# Patient Record
Sex: FEMALE | Race: WHITE | NOT HISPANIC OR LATINO | Employment: FULL TIME | URBAN - METROPOLITAN AREA
[De-identification: names, ages, dates, MRNs, and addresses within clinical notes are randomized per-mention and may not be internally consistent; named-entity substitution may affect disease eponyms.]

---

## 2023-03-30 RX ORDER — DROSPIRENONE AND ETHINYL ESTRADIOL 0.02-3(28)
KIT ORAL
COMMUNITY
End: 2024-06-10 | Stop reason: ALTCHOICE

## 2023-03-30 RX ORDER — LISINOPRIL 2.5 MG/1
2.5 TABLET ORAL DAILY
COMMUNITY
Start: 2022-12-04 | End: 2024-06-10 | Stop reason: ALTCHOICE

## 2023-03-30 RX ORDER — SERTRALINE HYDROCHLORIDE 100 MG/1
100 TABLET, FILM COATED ORAL DAILY
COMMUNITY
Start: 2022-04-20 | End: 2024-06-10 | Stop reason: ALTCHOICE

## 2023-03-30 RX ORDER — CETIRIZINE HYDROCHLORIDE 10 MG/1
10 TABLET ORAL DAILY
COMMUNITY
Start: 2022-02-25

## 2023-03-30 RX ORDER — ATORVASTATIN CALCIUM 80 MG/1
40 TABLET, FILM COATED ORAL NIGHTLY
COMMUNITY
Start: 2022-12-04 | End: 2024-05-30 | Stop reason: WASHOUT

## 2023-03-30 RX ORDER — METOPROLOL TARTRATE 25 MG/1
12.5 TABLET, FILM COATED ORAL DAILY
COMMUNITY
Start: 2022-12-12 | End: 2024-03-05

## 2023-03-30 RX ORDER — CYANOCOBALAMIN (VITAMIN B-12) 500 MCG
400 TABLET ORAL
COMMUNITY

## 2023-03-31 ENCOUNTER — APPOINTMENT (OUTPATIENT)
Dept: PRIMARY CARE | Facility: CLINIC | Age: 43
End: 2023-03-31
Payer: COMMERCIAL

## 2023-03-31 ENCOUNTER — OFFICE VISIT (OUTPATIENT)
Dept: PRIMARY CARE | Facility: CLINIC | Age: 43
End: 2023-03-31
Payer: COMMERCIAL

## 2023-03-31 VITALS
DIASTOLIC BLOOD PRESSURE: 70 MMHG | HEART RATE: 84 BPM | BODY MASS INDEX: 36.54 KG/M2 | SYSTOLIC BLOOD PRESSURE: 112 MMHG | WEIGHT: 223 LBS | TEMPERATURE: 96.7 F | OXYGEN SATURATION: 98 %

## 2023-03-31 DIAGNOSIS — L03.112 CELLULITIS OF LEFT AXILLA: Primary | ICD-10-CM

## 2023-03-31 PROCEDURE — 99214 OFFICE O/P EST MOD 30 MIN: CPT | Performed by: FAMILY MEDICINE

## 2023-03-31 PROCEDURE — 1036F TOBACCO NON-USER: CPT | Performed by: FAMILY MEDICINE

## 2023-03-31 RX ORDER — CLINDAMYCIN HYDROCHLORIDE 300 MG/1
300 CAPSULE ORAL 2 TIMES DAILY
Qty: 14 CAPSULE | Refills: 0 | Status: SHIPPED | OUTPATIENT
Start: 2023-03-31 | End: 2023-04-07

## 2023-03-31 NOTE — PROGRESS NOTES
Subjective   Patient ID: Ilsa Alex is a 42 y.o. female who presents for pt c/o bumps under left armpit.    Ilsa presents to discuss lesions under left armpit.  This problem initially started about 3 weeks ago.  She went to an urgent care where the lesion was drained.  She was started on doxycycline.  Due to worsening of her symptoms he went back to urgent care and was given a prescription for cephalexin.  Main lesion healed.  However now she has multiple lesions in the left armpit.  They are red and tender to the touch.  There is no significant swelling.  She has not had any fevers or chills.  Does have a history of MRSA.           Review of Systems   Constitutional:  Negative for appetite change, chills, fatigue and fever.   Respiratory:  Negative for cough and shortness of breath.    Cardiovascular:  Negative for chest pain.   Gastrointestinal:  Negative for abdominal pain, constipation, diarrhea, nausea and vomiting.   Skin:  Negative for rash.       Objective   /70   Pulse 84   Temp 35.9 °C (96.7 °F)   Wt 101 kg (223 lb)   SpO2 98%   BMI 36.54 kg/m²     Physical Exam  Constitutional:       General: She is not in acute distress.     Appearance: Normal appearance.   HENT:      Head: Normocephalic.      Mouth/Throat:      Mouth: Mucous membranes are moist.   Eyes:      Extraocular Movements: Extraocular movements intact.      Conjunctiva/sclera: Conjunctivae normal.   Cardiovascular:      Rate and Rhythm: Normal rate and regular rhythm.      Heart sounds: No murmur heard.  Pulmonary:      Breath sounds: No wheezing or rhonchi.   Musculoskeletal:      Cervical back: Neck supple.   Skin:     General: Skin is warm and dry.      Comments: Scattered erythematous comedones in left axilla.  Tender to touch and warm.   Neurological:      Mental Status: She is alert.   Psychiatric:         Mood and Affect: Mood normal.         Behavior: Behavior normal.         Assessment/Plan   Diagnoses and all  orders for this visit:  Cellulitis of left axilla  -     clindamycin (Cleocin) 300 mg capsule; Take 1 capsule (300 mg) by mouth in the morning and 1 capsule (300 mg) before bedtime. Do all this for 7 days.  Due to recurrence of axillary lesions, recommend that patient's start using shaving cream when shaving armpits.

## 2023-04-02 ASSESSMENT — ENCOUNTER SYMPTOMS
FATIGUE: 0
COUGH: 0
FEVER: 0
CHILLS: 0
CONSTIPATION: 0
NAUSEA: 0
SHORTNESS OF BREATH: 0
ABDOMINAL PAIN: 0
DIARRHEA: 0
VOMITING: 0
APPETITE CHANGE: 0

## 2023-08-24 ENCOUNTER — OFFICE VISIT (OUTPATIENT)
Dept: PRIMARY CARE | Facility: CLINIC | Age: 43
End: 2023-08-24
Payer: COMMERCIAL

## 2023-08-24 VITALS
SYSTOLIC BLOOD PRESSURE: 122 MMHG | OXYGEN SATURATION: 98 % | BODY MASS INDEX: 36.54 KG/M2 | DIASTOLIC BLOOD PRESSURE: 76 MMHG | TEMPERATURE: 97.6 F | WEIGHT: 223 LBS | HEART RATE: 75 BPM

## 2023-08-24 DIAGNOSIS — B07.0 PLANTAR WARTS: ICD-10-CM

## 2023-08-24 DIAGNOSIS — B35.3 TINEA PEDIS OF LEFT FOOT: Primary | ICD-10-CM

## 2023-08-24 PROCEDURE — 99213 OFFICE O/P EST LOW 20 MIN: CPT | Performed by: PHYSICIAN ASSISTANT

## 2023-08-24 PROCEDURE — 1036F TOBACCO NON-USER: CPT | Performed by: PHYSICIAN ASSISTANT

## 2023-08-24 RX ORDER — CLOTRIMAZOLE AND BETAMETHASONE DIPROPIONATE 10; .64 MG/G; MG/G
1 CREAM TOPICAL 2 TIMES DAILY
Qty: 30 G | Refills: 1 | Status: SHIPPED | OUTPATIENT
Start: 2023-08-24

## 2023-08-24 RX ORDER — DIROXIMEL FUMARATE 231 MG/1
CAPSULE ORAL
COMMUNITY
Start: 2023-07-19

## 2023-08-24 NOTE — PROGRESS NOTES
Subjective   Patient ID: Ilsa Alex is a 43 y.o. female who presents for pt c/o painful bumps on bottom of foot (Just noticed today).    HPI   Patient complains of a couple small bumps on bottom of left foot that she just happened to notice the past day. They are slightly tender with pressure.  No bumps noted elsewhere.     Has been having some red, scaly rash interdigitally and on 5th toe the past several weeks. Itches/burns. Applying cortisone helps slightly.     Review of Systems    Objective   /76   Pulse 75   Temp 36.4 °C (97.6 °F)   Wt 101 kg (223 lb)   SpO2 98%   BMI 36.54 kg/m²     Physical Exam  Vitals and nursing note reviewed.   Constitutional:       Appearance: Normal appearance. She is well-developed.   Eyes:      General: No scleral icterus.  Cardiovascular:      Rate and Rhythm: Normal rate and regular rhythm.      Heart sounds: No murmur heard.  Pulmonary:      Effort: Pulmonary effort is normal.      Breath sounds: Normal breath sounds.   Musculoskeletal:      Cervical back: Neck supple.   Skin:     General: Skin is warm and dry.      Comments: Left foot with flaky red rash interdigitally that is slightly moist, with some red flaking on dorsal aspect of the 5th toe, consistent with tinea.  On the plantar surface of left foot there are 3 small bumps (2-3 mm in diameter) that appear to have a dark central spot under the surface centrally. Appears consistent with small verruca.    Neurological:      Mental Status: She is alert.         Assessment/Plan   Diagnoses and all orders for this visit:  Tinea pedis of left foot  -     clotrimazole-betamethasone (Lotrisone) cream; Apply 1 Application topically 2 times a day. Apply to toes on feet.  Plantar warts       Apply Compound W to probable warts every day.   Rx clotrimazole/betamethasone to interdigital rash.   Follow up if symptoms increase or persist. Can call for referral to derm or podiatrist if symptoms persist.

## 2023-08-25 LAB — THYROTROPIN (MIU/L) IN SER/PLAS BY DETECTION LIMIT <= 0.05 MIU/L: 3.01 MIU/L (ref 0.44–3.98)

## 2023-08-26 LAB
ESTRADIOL (PG/ML) IN SER/PLAS: 80 PG/ML
FOLLITROPIN (IU/L) IN SER/PLAS: 4.3 IU/L
LUTEINIZING HORMONE (IU/ML) IN SER/PLAS: 5.8 IU/L
PROGESTERONE (NG/ML) IN SER/PLAS: 11.2 NG/ML

## 2024-01-17 ENCOUNTER — OFFICE VISIT (OUTPATIENT)
Dept: PRIMARY CARE | Facility: CLINIC | Age: 44
End: 2024-01-17
Payer: COMMERCIAL

## 2024-01-17 VITALS
OXYGEN SATURATION: 96 % | TEMPERATURE: 96.9 F | HEART RATE: 62 BPM | DIASTOLIC BLOOD PRESSURE: 82 MMHG | SYSTOLIC BLOOD PRESSURE: 124 MMHG

## 2024-01-17 DIAGNOSIS — J01.00 ACUTE NON-RECURRENT MAXILLARY SINUSITIS: Primary | ICD-10-CM

## 2024-01-17 PROBLEM — F32.A DEPRESSIVE DISORDER: Status: ACTIVE | Noted: 2023-11-10

## 2024-01-17 LAB
FLUAV RNA RESP QL NAA+PROBE: NOT DETECTED
FLUBV RNA RESP QL NAA+PROBE: NOT DETECTED
RSV RNA RESP QL NAA+PROBE: NOT DETECTED

## 2024-01-17 PROCEDURE — 87636 SARSCOV2 & INF A&B AMP PRB: CPT

## 2024-01-17 PROCEDURE — 87634 RSV DNA/RNA AMP PROBE: CPT

## 2024-01-17 PROCEDURE — 1036F TOBACCO NON-USER: CPT | Performed by: FAMILY MEDICINE

## 2024-01-17 PROCEDURE — 99213 OFFICE O/P EST LOW 20 MIN: CPT | Performed by: FAMILY MEDICINE

## 2024-01-17 RX ORDER — FLUOCINONIDE 0.5 MG/G
OINTMENT TOPICAL
COMMUNITY
Start: 2023-06-05

## 2024-01-17 RX ORDER — BUSPIRONE HYDROCHLORIDE 7.5 MG/1
7.5 TABLET ORAL 2 TIMES DAILY
COMMUNITY
Start: 2024-01-04

## 2024-01-17 RX ORDER — CARVEDILOL 3.12 MG/1
TABLET ORAL
COMMUNITY
Start: 2024-01-15

## 2024-01-17 RX ORDER — AMOXICILLIN AND CLAVULANATE POTASSIUM 875; 125 MG/1; MG/1
875 TABLET, FILM COATED ORAL 2 TIMES DAILY
Qty: 20 TABLET | Refills: 0 | Status: SHIPPED | OUTPATIENT
Start: 2024-01-20 | End: 2024-01-30

## 2024-01-17 RX ORDER — FLUOXETINE HYDROCHLORIDE 20 MG/1
CAPSULE ORAL
COMMUNITY
Start: 2023-11-10 | End: 2024-05-30 | Stop reason: WASHOUT

## 2024-01-17 RX ORDER — CLINDAMYCIN PHOSPHATE 10 UG/ML
LOTION TOPICAL
COMMUNITY
Start: 2023-06-05

## 2024-01-17 NOTE — PROGRESS NOTES
Subjective   Patient ID: Ilsa Alex is a 43 y.o. female who presents for Sinus Problem (Pressure, drainage ) and Headache (X 5 days).  HPI patient presents with sinus pressure congestion and drainage and headache x 5 days.  Has not tested for COVID at home.  Has numerous family members who are sick.  No fevers or chills that she is able to report.  No shortness of breath.    Patient Active Problem List   Diagnosis    Depressive disorder    IBS (irritable bowel syndrome)    Multiple sclerosis (CMS/HCC)       Social Connections: Not on file       Current Outpatient Medications on File Prior to Visit   Medication Sig Dispense Refill    atorvastatin (Lipitor) 80 mg tablet Take 0.5 tablets (40 mg) by mouth once daily at bedtime.      busPIRone (Buspar) 7.5 mg tablet Take 1 tablet (7.5 mg) by mouth 2 times a day.      carvedilol (Coreg) 3.125 mg tablet       cetirizine (ZyrTEC) 10 mg tablet Take 1 tablet (10 mg) by mouth once daily.      cholecalciferol (Vitamin D-3) 10 MCG (400 UNIT) tablet Take 1 tablet (10 mcg) by mouth.      clindamycin (Cleocin T) 1 % lotion Apply once daily as directed to the affected area after shaving      clotrimazole-betamethasone (Lotrisone) cream Apply 1 Application topically 2 times a day. Apply to toes on feet. 30 g 1    drospirenone-ethinyl estradioL (Lainey, Gianvi) 3-0.02 mg tablet       fluocinonide (Lidex) 0.05 % ointment Apply to affected area twice daily as needed. Cycle 2 weeks on, 1 week off. Not for face, armpits, or groin      FLUoxetine (PROzac) 20 mg capsule TAKE 1 CAPSULE DAILY FOR 7 DAYS, THEN 2 CAPSULES DAILY.      lisinopril 2.5 mg tablet Take 1 tablet (2.5 mg) by mouth once daily.      metoprolol tartrate (Lopressor) 25 mg tablet Take 0.5 tablets (12.5 mg) by mouth early in the morning..      multivit,calc,mins/iron/folic (ONE-A-DAY WOMENS FORMULA ORAL) Take 1 tablet by mouth once daily.      NUTRITIONAL SUPPLEMENTS ORAL Protandum Supplement      sertraline (Zoloft) 100  mg tablet Take 1 tablet (100 mg) by mouth once daily.      ticagrelor (Brilinta) 90 mg tablet Take 1 tablet (90 mg) by mouth 2 times a day.      Vumerity 231 mg capsule,delayed release(DR/EC) Take 1 capsule (231 mg) by mouth twice daily for 7 days, THEN 2 capsules (462 mg) twice daily for 23 days.       No current facility-administered medications on file prior to visit.        Vitals:    01/17/24 1141   BP: 124/82   Pulse: 62   Temp: 36.1 °C (96.9 °F)   SpO2: 96%     There were no vitals filed for this visit.    Review of Systems   All other systems reviewed and are negative.      Objective     Physical Exam  Vitals reviewed.   Constitutional:       General: She is not in acute distress.     Appearance: Normal appearance. She is well-developed. She is not diaphoretic.   HENT:      Head: Normocephalic and atraumatic.      Right Ear: Tympanic membrane normal.      Left Ear: Tympanic membrane normal.      Nose: Nose normal.      Mouth/Throat:      Mouth: Mucous membranes are moist.   Eyes:      Pupils: Pupils are equal, round, and reactive to light.   Cardiovascular:      Rate and Rhythm: Normal rate and regular rhythm.      Heart sounds: Normal heart sounds. No murmur heard.     No friction rub. No gallop.   Pulmonary:      Effort: Pulmonary effort is normal.      Breath sounds: Normal breath sounds. No rales.   Abdominal:      General: Bowel sounds are normal.      Palpations: Abdomen is soft.      Tenderness: There is no abdominal tenderness.   Musculoskeletal:      Cervical back: Normal range of motion and neck supple.   Skin:     General: Skin is warm and dry.   Neurological:      Mental Status: She is alert.   Psychiatric:         Mood and Affect: Mood normal.         No visits with results within 2 Month(s) from this visit.   Latest known visit with results is:   Orders Only on 08/25/2023   Component Date Value Ref Range Status    Progesterone 08/25/2023 11.2  ng/mL Final    Comment: REF VALUES  MALE               <0.3- 1.2    FOLLICULAR PHASE  <0.3- 1.4       LUTEAL PHASE       3.3-25.6     MID-LUTEAL PHASE   4.4-28.0  POSTMENOPAUSAL    <0.3- 0.7  PREGNANT FEMALES:     1ST TRIMESTER     11.2- 90.0         2ND TRIMESTER     25.6- 89.4     3RD TRIMESTER     48.4-422.5   .  Patients receiving DHEA-S supplements may show false elevation of  progesterone for results near 1.0 ng/mL. Contact laboratory at  [phone] if alternative testing is needed.      Follicle Stimulating Hormone 08/25/2023 4.3  IU/L Final    Comment: REF VALUES  FOLLICULAR  2-12  MID-CYCLE     12-25  LUTEAL PHASE   2-12  MENOPAUSE       PREPUBERTY    50% ADULT  ADULT MALE     2-10  INFANTS        0-1      Luteinizing Hormone 08/25/2023 5.8  IU/L Final    Comment: REF VALUES  FOLLICULAR PHASE 1.9-12.5  MID-CYCLE        8.7-76.3  LUTEAL PHASE     0.5-16.9  POST MENOPAUSE   5.0-55.2  CHILDREN           0- 6.0  ADULT MALE 18-70 1.5- 9.3  ADULT MALE >70   3.1-34.6      Estradiol 08/25/2023 80  pg/mL Final    Comment: REF VALUES  FOLLICULAR PHASE      MID CYCLE             LUTEAL PHASE          POSTMENOPAUSE         < 32  PREPUBERTY            < 20  FEMALE 10-18Y        8-110  MALE   10-18Y         < 20  ADULT MALE            < 40      TSH 08/25/2023 3.01  0.44 - 3.98 mIU/L Final    Comment:  TSH testing is performed using different testing    methodology at [facility] than at other    [facility] hospitals. Direct result comparisons should    only be made within the same method.         Assessment/Plan   Problem List Items Addressed This Visit    None  Visit Diagnoses         Codes    Acute non-recurrent maxillary sinusitis    -  Primary J01.00    Relevant Medications    amoxicillin-pot clavulanate (Augmentin) 875-125 mg tablet (Start on 1/20/2024)    Other Relevant Orders    Sars-CoV-2 PCR, Symptomatic    Influenza A, and B PCR    RSV PCR          Testing patient for RSV flu and COVID.  Too early for antibiotics to be useful  but postdated a prescription that if she is not feeling better in 4 days then she can have it filled.

## 2024-01-18 ENCOUNTER — TELEPHONE (OUTPATIENT)
Dept: PRIMARY CARE | Facility: CLINIC | Age: 44
End: 2024-01-18
Payer: COMMERCIAL

## 2024-01-18 LAB — SARS-COV-2 RNA RESP QL NAA+PROBE: NOT DETECTED

## 2024-01-18 NOTE — TELEPHONE ENCOUNTER
----- Message from Lang Pulido MD sent at 1/18/2024 10:06 AM EST -----  Patient's viral swabs are negative

## 2024-02-29 DIAGNOSIS — I42.9 CARDIOMYOPATHY, UNSPECIFIED (MULTI): ICD-10-CM

## 2024-03-01 RX ORDER — TICAGRELOR 90 MG/1
90 TABLET ORAL 2 TIMES DAILY
Qty: 180 TABLET | Refills: 4 | Status: SHIPPED | OUTPATIENT
Start: 2024-03-01

## 2024-03-05 DIAGNOSIS — I10 PRIMARY HYPERTENSION: Primary | ICD-10-CM

## 2024-03-05 RX ORDER — METOPROLOL TARTRATE 25 MG/1
TABLET, FILM COATED ORAL
Qty: 90 TABLET | Refills: 1 | Status: SHIPPED | OUTPATIENT
Start: 2024-03-05

## 2024-03-15 ENCOUNTER — OFFICE VISIT (OUTPATIENT)
Dept: PRIMARY CARE | Facility: CLINIC | Age: 44
End: 2024-03-15
Payer: COMMERCIAL

## 2024-03-15 VITALS
OXYGEN SATURATION: 96 % | DIASTOLIC BLOOD PRESSURE: 70 MMHG | TEMPERATURE: 97.2 F | HEART RATE: 71 BPM | SYSTOLIC BLOOD PRESSURE: 120 MMHG

## 2024-03-15 DIAGNOSIS — J32.9 SINUSITIS, UNSPECIFIED CHRONICITY, UNSPECIFIED LOCATION: Primary | ICD-10-CM

## 2024-03-15 DIAGNOSIS — R05.1 ACUTE COUGH: ICD-10-CM

## 2024-03-15 PROCEDURE — 1036F TOBACCO NON-USER: CPT | Performed by: PHYSICIAN ASSISTANT

## 2024-03-15 PROCEDURE — 87636 SARSCOV2 & INF A&B AMP PRB: CPT

## 2024-03-15 PROCEDURE — 99214 OFFICE O/P EST MOD 30 MIN: CPT | Performed by: PHYSICIAN ASSISTANT

## 2024-03-15 RX ORDER — BENZONATATE 100 MG/1
100 CAPSULE ORAL 3 TIMES DAILY PRN
Qty: 42 CAPSULE | Refills: 0 | Status: SHIPPED | OUTPATIENT
Start: 2024-03-15 | End: 2024-04-14

## 2024-03-15 RX ORDER — AMOXICILLIN AND CLAVULANATE POTASSIUM 875; 125 MG/1; MG/1
875 TABLET, FILM COATED ORAL 2 TIMES DAILY
Qty: 20 TABLET | Refills: 0 | Status: SHIPPED | OUTPATIENT
Start: 2024-03-15 | End: 2024-03-25

## 2024-03-15 ASSESSMENT — ENCOUNTER SYMPTOMS
ABDOMINAL PAIN: 0
SHORTNESS OF BREATH: 0

## 2024-03-15 NOTE — PROGRESS NOTES
Subjective   Patient ID: Ilsa Alex is a 43 y.o. female who presents for Cough (Sinus drainage x 1 wk/Covid [id number]).    HPI   Patient c/o cough, nasal discharge or sinus pain. Denies fever, chills, aches, shortness of breath and sore throat.  Present for 7 days. Has worsened since onset.   Cough: Cough is productive of greenish sputum.   Nasal discharge: Described as purulent.   Denies associated diarrhea, earache and vomiting.     Taking OTC mucinex.    Patient denies recent known COVID exposure or international travel.     Is on [employer] for MS.     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     Allergies   Allergen Reactions    Banana Other     Stomach pain     Gluten Other     Stomach pain     Sulfa (Sulfonamide Antibiotics) Hives          reports that she has never smoked. She has never used smokeless tobacco.      Review of Systems   Respiratory:  Negative for shortness of breath.    Cardiovascular:  Negative for chest pain.   Gastrointestinal:  Negative for abdominal pain.       Objective   /70   Pulse 71   Temp 36.2 °C (97.2 °F)   SpO2 96%     Physical Exam  Vitals and nursing note reviewed.   Constitutional:       General: She is not in acute distress.     Appearance: Normal appearance.   HENT:      Head: Normocephalic.      Right Ear: Tympanic membrane, ear canal and external ear normal.      Left Ear: Tympanic membrane, ear canal and external ear normal.      Nose: Congestion present.      Right Sinus: Maxillary sinus tenderness present.      Left Sinus: Maxillary sinus tenderness present.      Mouth/Throat:      Mouth: Mucous membranes are moist.      Pharynx: No oropharyngeal exudate or posterior oropharyngeal erythema.      Comments: Has thick PND.   Eyes:      General: No scleral icterus.  Cardiovascular:      Rate and Rhythm: Normal rate and regular rhythm.   Pulmonary:      Effort: Pulmonary effort is normal.      Breath sounds: Normal breath sounds. No wheezing, rhonchi or  rales.   Lymphadenopathy:      Cervical: No cervical adenopathy.   Neurological:      Mental Status: She is alert.         Assessment/Plan   Diagnoses and all orders for this visit:  Sinusitis, unspecified chronicity, unspecified location  -     benzonatate (Tessalon) 100 mg capsule; Take 1 capsule (100 mg) by mouth 3 times a day as needed for cough. Do not crush or chew.  -     amoxicillin-pot clavulanate (Augmentin) 875-125 mg tablet; Take 1 tablet (875 mg) by mouth 2 times a day for 10 days.  -     Sars-CoV-2 PCR; Future  -     Influenza A, and B PCR; Future  Acute cough  -     benzonatate (Tessalon) 100 mg capsule; Take 1 capsule (100 mg) by mouth 3 times a day as needed for cough. Do not crush or chew.  -     amoxicillin-pot clavulanate (Augmentin) 875-125 mg tablet; Take 1 tablet (875 mg) by mouth 2 times a day for 10 days.  -     Sars-CoV-2 PCR; Future  -     Influenza A, and B PCR; Future       Rx Augmentin.   Can use Mucinex DM.   Send nasal swab specimen for COVID, Influenza testing.   Encourage fluids and rest.   Follow up if symptoms increase or persist.

## 2024-03-16 LAB
FLUAV RNA RESP QL NAA+PROBE: NOT DETECTED
FLUBV RNA RESP QL NAA+PROBE: NOT DETECTED
SARS-COV-2 RNA RESP QL NAA+PROBE: NOT DETECTED

## 2024-05-30 ENCOUNTER — OFFICE VISIT (OUTPATIENT)
Dept: PRIMARY CARE | Facility: CLINIC | Age: 44
End: 2024-05-30
Payer: COMMERCIAL

## 2024-05-30 VITALS
HEART RATE: 83 BPM | DIASTOLIC BLOOD PRESSURE: 52 MMHG | SYSTOLIC BLOOD PRESSURE: 94 MMHG | OXYGEN SATURATION: 98 % | WEIGHT: 228 LBS | BODY MASS INDEX: 37.36 KG/M2 | TEMPERATURE: 97.4 F

## 2024-05-30 DIAGNOSIS — H69.92 EUSTACHIAN TUBE DYSFUNCTION, LEFT: Primary | ICD-10-CM

## 2024-05-30 PROBLEM — I25.10 ARTERIOSCLEROSIS OF CORONARY ARTERY: Status: ACTIVE | Noted: 2022-12-16

## 2024-05-30 PROCEDURE — 99213 OFFICE O/P EST LOW 20 MIN: CPT | Performed by: PHYSICIAN ASSISTANT

## 2024-05-30 PROCEDURE — 1036F TOBACCO NON-USER: CPT | Performed by: PHYSICIAN ASSISTANT

## 2024-05-30 RX ORDER — ATORVASTATIN CALCIUM 40 MG/1
40 TABLET, FILM COATED ORAL DAILY
COMMUNITY
Start: 2024-05-20

## 2024-05-30 RX ORDER — FLUOXETINE HYDROCHLORIDE 40 MG/1
40 CAPSULE ORAL DAILY
COMMUNITY
Start: 2024-04-23

## 2024-05-30 RX ORDER — TICAGRELOR 60 MG/1
60 TABLET ORAL 2 TIMES DAILY
COMMUNITY
Start: 2024-04-19

## 2024-05-30 NOTE — PROGRESS NOTES
"Subjective   Patient ID: Ilsa Alex is a 44 y.o. female who presents for Foreign Body in Ear (L ear feels \"clogged\", feels something moving in ear x4 days. ).    HPI     Felt a bug on her ear 3 days ago and brushed it away, but later that day started feeling like her left ear was clogged. It has persisted, so wants checked to ensure there isn't an insect in her ear. Does get seasonal allergies and takes Zyrtec.   Right ear feels fine. No significant ear pain (it was a little sore initially).   Advised that she is nervous about a bug since she picked a tick off bother her  and son that same day.     Review of Systems    Objective   BP 94/52   Pulse 83   Temp 36.3 °C (97.4 °F)   Wt 103 kg (228 lb)   SpO2 98%   BMI 37.36 kg/m²     Physical Exam  Constitutional:       Appearance: Normal appearance.   HENT:      Head: Normocephalic.      Left Ear: Ear canal and external ear normal.      Ears:      Comments: Mild increased fluid behind left TM, without erythema. No evidence of any foreign body in canal. No wax. No insect.   Eyes:      General: No scleral icterus.  Pulmonary:      Effort: Pulmonary effort is normal. No respiratory distress.   Neurological:      Mental Status: She is alert.   Psychiatric:         Mood and Affect: Mood normal.         Assessment/Plan   Diagnoses and all orders for this visit:  Eustachian tube dysfunction, left       Use OTC Flonase and her zyrtec.   No FB in ear canal.  Follow up prn.   "

## 2024-06-10 ENCOUNTER — OFFICE VISIT (OUTPATIENT)
Dept: OBSTETRICS AND GYNECOLOGY | Facility: CLINIC | Age: 44
End: 2024-06-10
Payer: COMMERCIAL

## 2024-06-10 VITALS — WEIGHT: 228.84 LBS | DIASTOLIC BLOOD PRESSURE: 70 MMHG | BODY MASS INDEX: 37.5 KG/M2 | SYSTOLIC BLOOD PRESSURE: 110 MMHG

## 2024-06-10 DIAGNOSIS — Z12.31 ENCOUNTER FOR SCREENING MAMMOGRAM FOR MALIGNANT NEOPLASM OF BREAST: ICD-10-CM

## 2024-06-10 DIAGNOSIS — Z01.419 ENCOUNTER FOR WELL WOMAN EXAM WITH ROUTINE GYNECOLOGICAL EXAM: Primary | ICD-10-CM

## 2024-06-10 PROCEDURE — 99396 PREV VISIT EST AGE 40-64: CPT | Performed by: NURSE PRACTITIONER

## 2024-06-10 PROCEDURE — 1036F TOBACCO NON-USER: CPT | Performed by: NURSE PRACTITIONER

## 2024-06-10 RX ORDER — LEVONORGESTREL 52 MG/1
1 INTRAUTERINE DEVICE INTRAUTERINE ONCE
COMMUNITY

## 2024-06-10 NOTE — PROGRESS NOTES
HPI:   Ilsa Alex is a 44 y.o. who presents today for her annual gynecologic exam without complaints    She has the following concerns; None. She is doing well. Followed up with psych. They changed her meds and she is doing better now. PAP is due next year.     GYN HISTORY:  Periods are rare, lasting 2 days.   Dysmenorrhea:none. Cyclic symptoms include none.   No intermenstrual bleeding, spotting, or discharge.    Current contraception: IUD      Requests STD testing: no     PAP History   Last pap:   2021 Normal HPV Negative  History of abnormal pap: no  HPV vaccine: no  @paphx@    Health Screening  Family history of breast, uterine, ovarian or colon cancer: yes - maternal grandmother has a hx of breast cancer.     Breast cancer: mammogram - needs an order.   Last mammogram: 2023    Colon cancer: due at age 45       The patient feels safe at home.         Review of Systems:   Constitutional: no fever and no chills.  Cardiovascular: no chest pain.   Respiratory: no shortness of breath.   Gastrointestinal: no nausea, no abdominal pain and no constipation  Genitourinary: no dysuria, no urinary incontinence, no vaginal dryness, no pelvic pain and no vaginal discharge.   Neurological: no headache.  Psychiatric: no anxiety and no depression.              Objective         /70   Wt 104 kg (228 lb 13.4 oz)   LMP 05/25/2024 (Exact Date)   BMI 37.50 kg/m²         Physical Exam:   Constitutional: Alert and in no acute distress. Well developed, well nourished.      Neck: No neck asymmetry. Supple. Thyroid not enlarged and there were no palpable thyroid nodules.      Cardiovascular: Heart rate and rhythm were normal, normal S1 and S2, no gallops, and no murmurs.      Pulmonary: No respiratory distress. Clear bilateral breath sounds.      Chest: Breasts: Normal appearance, no nipple discharge and no skin changes. Palpation of breasts and axillae: No palpable mass and no axillary lymphadenopathy.      Abdomen:  Soft nontender; no abdominal mass palpated. Normal bowel sounds. No organomegaly.      Genitourinary:   - External genitalia: Normal.   - Palpation of lymph nodes in groin: No inguinal lymphadenopathy.   - Bartholin's Urethral and Skenes Glands: Normal.   - Urethra: Normal.    -Bladder: Normal on palpation.   - Vagina: Normal.   - Cervix: Normal. + IUD strings noted at cervical os.   - Uterus: Normal. Right Adnexa/parametria: Normal. Left Adnexa/parametria: Normal.   - Perianal Area: Normal.      Skin: Normal skin color and pigmentation, normal skin turgor, and no rash     Psychiatric: Alert and oriented x 3. Affect normal to patient baseline. Mood: Appropriate.            Assessment/Plan       Diagnoses and all orders for this visit:  Encounter for well woman exam with routine gynecological exam  Here for well woman exam. She is doing well. PAP is due next week. Mammogram due in July.   Encounter for screening mammogram for malignant neoplasm of breast  -     BI mammo bilateral screening tomosynthesis; Future  Follow-up annually; sooner if needed.       Brenna De Anda, APRN-CNP

## 2024-07-17 ENCOUNTER — HOSPITAL ENCOUNTER (OUTPATIENT)
Dept: RADIOLOGY | Facility: CLINIC | Age: 44
Discharge: HOME | End: 2024-07-17
Payer: COMMERCIAL

## 2024-07-17 VITALS — BODY MASS INDEX: 36.64 KG/M2 | WEIGHT: 228 LBS | HEIGHT: 66 IN

## 2024-07-17 DIAGNOSIS — Z12.31 ENCOUNTER FOR SCREENING MAMMOGRAM FOR MALIGNANT NEOPLASM OF BREAST: ICD-10-CM

## 2024-07-17 PROCEDURE — 77067 SCR MAMMO BI INCL CAD: CPT

## 2025-01-14 ENCOUNTER — APPOINTMENT (OUTPATIENT)
Dept: PRIMARY CARE | Facility: CLINIC | Age: 45
End: 2025-01-14
Payer: COMMERCIAL

## 2025-01-14 VITALS
SYSTOLIC BLOOD PRESSURE: 106 MMHG | DIASTOLIC BLOOD PRESSURE: 78 MMHG | HEART RATE: 85 BPM | TEMPERATURE: 96.9 F | OXYGEN SATURATION: 98 % | WEIGHT: 244.8 LBS | BODY MASS INDEX: 40.12 KG/M2

## 2025-01-14 DIAGNOSIS — E66.01 MORBID OBESITY DUE TO EXCESS CALORIES (MULTI): Primary | ICD-10-CM

## 2025-01-14 DIAGNOSIS — R73.01 IFG (IMPAIRED FASTING GLUCOSE): ICD-10-CM

## 2025-01-14 PROCEDURE — 1036F TOBACCO NON-USER: CPT | Performed by: FAMILY MEDICINE

## 2025-01-14 PROCEDURE — 99213 OFFICE O/P EST LOW 20 MIN: CPT | Performed by: FAMILY MEDICINE

## 2025-03-13 ENCOUNTER — APPOINTMENT (OUTPATIENT)
Dept: PRIMARY CARE | Facility: CLINIC | Age: 45
End: 2025-03-13
Payer: COMMERCIAL

## 2025-03-26 ENCOUNTER — APPOINTMENT (OUTPATIENT)
Dept: PRIMARY CARE | Facility: CLINIC | Age: 45
End: 2025-03-26
Payer: COMMERCIAL

## 2025-03-26 VITALS
SYSTOLIC BLOOD PRESSURE: 118 MMHG | WEIGHT: 228.8 LBS | HEART RATE: 85 BPM | OXYGEN SATURATION: 98 % | DIASTOLIC BLOOD PRESSURE: 80 MMHG | BODY MASS INDEX: 37.5 KG/M2 | TEMPERATURE: 97.6 F

## 2025-03-26 DIAGNOSIS — R73.01 IFG (IMPAIRED FASTING GLUCOSE): ICD-10-CM

## 2025-03-26 DIAGNOSIS — E66.01 MORBID OBESITY DUE TO EXCESS CALORIES (MULTI): Primary | ICD-10-CM

## 2025-03-26 PROCEDURE — 99213 OFFICE O/P EST LOW 20 MIN: CPT | Performed by: FAMILY MEDICINE

## 2025-03-26 PROCEDURE — 1036F TOBACCO NON-USER: CPT | Performed by: FAMILY MEDICINE

## 2025-03-26 RX ORDER — CLOPIDOGREL BISULFATE 75 MG/1
TABLET ORAL DAILY
COMMUNITY

## 2025-03-26 NOTE — PROGRESS NOTES
Subjective   Patient ID: Ilsa Alex is a 44 y.o. female who presents for Obesity (recheck).  HPI Pt presents for follow up for her obesity.  Has been taking Mounjaro (paying cash) and titrating upwards.  She has a little nausea right after taking it but it fades.  It has helped her to make better dietary decisions.  Still not exercising a lot.  Hasn't been counting calories.      Patient Active Problem List   Diagnosis    Depressive disorder    IBS (irritable bowel syndrome)    Multiple sclerosis (Multi)    Arteriosclerosis of coronary artery       Social Connections: Not on file       Current Outpatient Medications on File Prior to Visit   Medication Sig Dispense Refill    atorvastatin (Lipitor) 40 mg tablet Take 1 tablet (40 mg) by mouth once daily.      busPIRone (Buspar) 7.5 mg tablet Take 1 tablet (7.5 mg) by mouth 2 times a day.      carvedilol (Coreg) 3.125 mg tablet       cetirizine (ZyrTEC) 10 mg tablet Take 1 tablet (10 mg) by mouth once daily.      cholecalciferol (Vitamin D-3) 10 MCG (400 UNIT) tablet Take 1 tablet (10 mcg) by mouth.      clindamycin (Cleocin T) 1 % lotion Apply once daily as directed to the affected area after shaving      clopidogrel (Plavix) 75 mg tablet Take by mouth once daily.      clotrimazole-betamethasone (Lotrisone) cream Apply 1 Application topically 2 times a day. Apply to toes on feet. 30 g 1    FLUoxetine (PROzac) 40 mg capsule Take 1 capsule (40 mg) by mouth once daily.      levonorgestrel (Mirena) 21 mcg/24 hr (8 yrs) 52 mg IUD 52 mg by intrauterine route 1 time.      metoprolol tartrate (Lopressor) 25 mg tablet TAKE 1/2 TWICE DAILY 90 tablet 1    multivit,calc,mins/iron/folic (ONE-A-DAY WOMENS FORMULA ORAL) Take 1 tablet by mouth once daily.      NUTRITIONAL SUPPLEMENTS ORAL Protandum Supplement      Vumerity 231 mg capsule,delayed release(DR/EC) Take 1 capsule (231 mg) by mouth twice daily for 7 days, THEN 2 capsules (462 mg) twice daily for 23 days.       fluocinonide (Lidex) 0.05 % ointment Apply to affected area twice daily as needed. Cycle 2 weeks on, 1 week off. Not for face, armpits, or groin      [DISCONTINUED] Brilinta 60 mg tablet Take 1 tablet (60 mg) by mouth 2 times a day. (Patient not taking: Reported on 3/26/2025)      [DISCONTINUED] Brilinta 90 mg tablet TAKE 1 TABLET TWICE A DAY (Patient not taking: Reported on 6/10/2024) 180 tablet 4     No current facility-administered medications on file prior to visit.        Vitals:    03/26/25 1309   BP: 118/80   Pulse: 85   Temp: 36.4 °C (97.6 °F)   SpO2: 98%     Vitals:    03/26/25 1309   Weight: 104 kg (228 lb 12.8 oz)       Review of Systems   All other systems reviewed and are negative.      Objective     Physical Exam  Constitutional:       Appearance: Normal appearance. She is well-developed.   HENT:      Head: Atraumatic.   Cardiovascular:      Rate and Rhythm: Normal rate and regular rhythm.      Heart sounds: Normal heart sounds. No murmur heard.  Pulmonary:      Effort: Pulmonary effort is normal.      Breath sounds: Normal breath sounds.   Abdominal:      General: Bowel sounds are normal.      Palpations: Abdomen is soft.   Skin:     General: Skin is warm.   Neurological:      General: No focal deficit present.      Mental Status: She is alert.   Psychiatric:         Mood and Affect: Mood normal.         No visits with results within 2 Month(s) from this visit.   Latest known visit with results is:   Office Visit on 03/15/2024   Component Date Value Ref Range Status    Coronavirus 2019, PCR 03/15/2024 Not Detected  Not Detected Final    Flu A Result 03/15/2024 Not Detected  Not Detected Final    Flu B Result 03/15/2024 Not Detected  Not Detected Final       Assessment/Plan   Problem List Items Addressed This Visit    None  Visit Diagnoses         Codes    Morbid obesity due to excess calories (Multi)    -  Primary E66.01    IFG (impaired fasting glucose)     R73.01            Continue Mounjaro  titration.  Aim for 1400 jitendra diet.  30 mins cardio exercise daily.  Fu in 6 mo with log of activity and calories.  Weigh self daily.   no

## 2025-07-31 DIAGNOSIS — E66.01 MORBID OBESITY DUE TO EXCESS CALORIES (MULTI): ICD-10-CM

## 2025-07-31 DIAGNOSIS — R73.01 IFG (IMPAIRED FASTING GLUCOSE): ICD-10-CM

## 2025-07-31 RX ORDER — TIRZEPATIDE 5 MG/.5ML
5 INJECTION, SOLUTION SUBCUTANEOUS WEEKLY
Qty: 2 ML | Refills: 3 | Status: SHIPPED | OUTPATIENT
Start: 2025-07-31

## 2025-07-31 RX ORDER — TIRZEPATIDE 5 MG/.5ML
5 INJECTION, SOLUTION SUBCUTANEOUS WEEKLY
Qty: 2 ML | Refills: 3 | Status: SHIPPED | OUTPATIENT
Start: 2025-07-31 | End: 2025-07-31 | Stop reason: SDUPTHER

## 2025-07-31 NOTE — TELEPHONE ENCOUNTER
Patient is asking for a refill on tirzepatide (Mounjaro) 5 mg/0.5 mL pen injector please send to [facility]

## 2025-07-31 NOTE — TELEPHONE ENCOUNTER
[facility] called rx line at 1218 stating the incorrect b12 verbage was written on pts RX. They need b12 strength and reason on ALL rx. Thanks, CG

## 2025-08-02 ENCOUNTER — APPOINTMENT (OUTPATIENT)
Dept: RADIOLOGY | Facility: HOSPITAL | Age: 45
End: 2025-08-02
Payer: COMMERCIAL

## 2025-08-02 ENCOUNTER — HOSPITAL ENCOUNTER (EMERGENCY)
Facility: HOSPITAL | Age: 45
Discharge: HOME | End: 2025-08-02
Payer: COMMERCIAL

## 2025-08-02 VITALS
RESPIRATION RATE: 12 BRPM | BODY MASS INDEX: 33.75 KG/M2 | DIASTOLIC BLOOD PRESSURE: 89 MMHG | SYSTOLIC BLOOD PRESSURE: 108 MMHG | HEIGHT: 66 IN | TEMPERATURE: 96.8 F | HEART RATE: 79 BPM | WEIGHT: 210 LBS | OXYGEN SATURATION: 97 %

## 2025-08-02 DIAGNOSIS — S82.831A CLOSED FRACTURE OF DISTAL END OF RIGHT FIBULA, UNSPECIFIED FRACTURE MORPHOLOGY, INITIAL ENCOUNTER: Primary | ICD-10-CM

## 2025-08-02 PROCEDURE — 96375 TX/PRO/DX INJ NEW DRUG ADDON: CPT

## 2025-08-02 PROCEDURE — 2500000004 HC RX 250 GENERAL PHARMACY W/ HCPCS (ALT 636 FOR OP/ED): Mod: JZ | Performed by: NURSE PRACTITIONER

## 2025-08-02 PROCEDURE — 99284 EMERGENCY DEPT VISIT MOD MDM: CPT | Mod: 25

## 2025-08-02 PROCEDURE — 29515 APPLICATION SHORT LEG SPLINT: CPT | Performed by: NURSE PRACTITIONER

## 2025-08-02 PROCEDURE — 73610 X-RAY EXAM OF ANKLE: CPT | Mod: RT

## 2025-08-02 PROCEDURE — 96374 THER/PROPH/DIAG INJ IV PUSH: CPT

## 2025-08-02 PROCEDURE — 73610 X-RAY EXAM OF ANKLE: CPT | Mod: RIGHT SIDE | Performed by: RADIOLOGY

## 2025-08-02 RX ORDER — NAPROXEN 500 MG/1
500 TABLET ORAL
Qty: 14 TABLET | Refills: 0 | Status: SHIPPED | OUTPATIENT
Start: 2025-08-02 | End: 2025-08-02

## 2025-08-02 RX ORDER — ONDANSETRON HYDROCHLORIDE 2 MG/ML
4 INJECTION, SOLUTION INTRAVENOUS ONCE
Status: COMPLETED | OUTPATIENT
Start: 2025-08-02 | End: 2025-08-02

## 2025-08-02 RX ORDER — OXYCODONE AND ACETAMINOPHEN 5; 325 MG/1; MG/1
1 TABLET ORAL EVERY 6 HOURS PRN
Qty: 5 TABLET | Refills: 0 | Status: SHIPPED | OUTPATIENT
Start: 2025-08-02 | End: 2025-08-02

## 2025-08-02 RX ORDER — NAPROXEN 500 MG/1
500 TABLET ORAL
Qty: 14 TABLET | Refills: 0 | Status: SHIPPED | OUTPATIENT
Start: 2025-08-02 | End: 2025-08-09

## 2025-08-02 RX ORDER — HYDROMORPHONE HYDROCHLORIDE 1 MG/ML
1 INJECTION, SOLUTION INTRAMUSCULAR; INTRAVENOUS; SUBCUTANEOUS ONCE
Status: COMPLETED | OUTPATIENT
Start: 2025-08-02 | End: 2025-08-02

## 2025-08-02 RX ORDER — OXYCODONE AND ACETAMINOPHEN 5; 325 MG/1; MG/1
1 TABLET ORAL EVERY 6 HOURS PRN
Qty: 5 TABLET | Refills: 0 | Status: SHIPPED | OUTPATIENT
Start: 2025-08-02 | End: 2025-08-05

## 2025-08-02 RX ADMIN — ONDANSETRON 4 MG: 2 INJECTION, SOLUTION INTRAMUSCULAR; INTRAVENOUS at 17:12

## 2025-08-02 RX ADMIN — HYDROMORPHONE HYDROCHLORIDE 1 MG: 1 INJECTION, SOLUTION INTRAMUSCULAR; INTRAVENOUS; SUBCUTANEOUS at 17:12

## 2025-08-02 ASSESSMENT — LIFESTYLE VARIABLES
HAVE PEOPLE ANNOYED YOU BY CRITICIZING YOUR DRINKING: NO
TOTAL SCORE: 0
HAVE YOU EVER FELT YOU SHOULD CUT DOWN ON YOUR DRINKING: NO
EVER HAD A DRINK FIRST THING IN THE MORNING TO STEADY YOUR NERVES TO GET RID OF A HANGOVER: NO
EVER FELT BAD OR GUILTY ABOUT YOUR DRINKING: NO

## 2025-08-02 ASSESSMENT — PAIN SCALES - GENERAL: PAINLEVEL_OUTOF10: 10 - WORST POSSIBLE PAIN

## 2025-08-02 ASSESSMENT — PAIN DESCRIPTION - ORIENTATION: ORIENTATION: RIGHT

## 2025-08-02 ASSESSMENT — PAIN DESCRIPTION - DIRECTION: RADIATING_TOWARDS: FOOT

## 2025-08-02 ASSESSMENT — PAIN DESCRIPTION - LOCATION: LOCATION: ANKLE

## 2025-08-02 ASSESSMENT — PAIN - FUNCTIONAL ASSESSMENT: PAIN_FUNCTIONAL_ASSESSMENT: 0-10
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
## 2025-08-02 NOTE — ED PROVIDER NOTES
"    HPI   Chief Complaint   Patient presents with    Ankle Pain    Foot Pain       This is a 45-year-old  female that is presenting to the emergency room with complaints of right ankle pain status post fall.  The patient was biking and twisted her ankle while trying to move over on the path.  The patient has deformity, swelling, and pain to the right ankle.  She is unable to bear weight.  Reports that her foot feels slightly numb.  She was given Zofran and fentanyl during transport and the leg was immobilized.  The patient denies any other injury as a result of the fall.  The patient denies any prior injury.      History provided by:  Patient   used: No                          [place] Coma Scale Score: 15                  Patient History   Medical History[1]  Surgical History[2]  Family History[3]  Social History[4]    Physical Exam   Visit Vitals  BP (!) 108/39 (BP Location: Right arm, Patient Position: Lying)   Pulse 81   Temp 36 °C (96.8 °F) (Temporal)   Resp 13   Ht 1.676 m (5' 6\")   Wt 95.3 kg (210 lb)   SpO2 97%   BMI 33.89 kg/m²   OB Status Having periods   Smoking Status Never   BSA 2.11 m²      Physical Exam  Vitals and nursing note reviewed.   HENT:      Head: Normocephalic.      Right Ear: External ear normal.      Left Ear: External ear normal.      Nose: Nose normal.     Cardiovascular:      Rate and Rhythm: Normal rate and regular rhythm.      Pulses: Normal pulses.      Heart sounds: Normal heart sounds.   Pulmonary:      Effort: Pulmonary effort is normal.      Breath sounds: Normal breath sounds.     Musculoskeletal:      Comments: No midline spinal tenderness.  Moves all extremities with normal range of motion and muscle strength except the right ankle.  The patient has obvious deformity and limited range of motion.  She has diffuse tenderness with palpation of the ankle.  Distal extremity brisk cap refill and sensation intact.  No calf pain.  Pulses are palpable and " strong.     Skin:     General: Skin is warm.      Capillary Refill: Capillary refill takes less than 2 seconds.     Neurological:      General: No focal deficit present.      Mental Status: She is alert.     Psychiatric:         Mood and Affect: Mood normal.         XR ankle right 3+ views    (Results Pending)       Labs Reviewed - No data to display      ED Course & MDM   Diagnoses as of 08/02/25 2007   Closed fracture of distal end of right fibula, unspecified fracture morphology, initial encounter           Medical Decision Making  The patient was seen and evaluated by the nurse practitioner, Janell Lyon.  The patient is presenting to the emergency room with complaints of a right ankle injury.  Differential diagnosis includes fracture, dislocation, sprain, or other acute process.  Saline lock was established by EMS.  The patient was administered 1 mg of Dilaudid IV and Zofran 4 mg IV.  The patient reported improvement of her pain symptoms after medication administration.  An x-ray of the ankle was obtained and showed an acute and mildly displaced fracture of the distal fibula with widening of the medial clear space concerning for a ligamentous injury.  There is bilateral soft tissue swelling with joint effusion.  No dislocation appreciated.  The patient was notified of the imaging results.  The patient was placed in a custom short leg and stirrup splint by me.  She was neurovascularly intact after splint placement.  She was educated on RICE therapy and splint care.  The patient was advised to avoid weightbearing activities and was given crutches to aid with ambulation.  The patient was provided prescriptions for naproxen and Percocet for home administration.  She was referred to orthopedics for definitive care.  She was given strict return precautions.  The patient was discharged in stable condition with computer discharge instructions given.           Your medication list        ASK your doctor about these  medications        Instructions Last Dose Given Next Dose Due   atorvastatin 40 mg tablet  Commonly known as: Lipitor           busPIRone 7.5 mg tablet  Commonly known as: Buspar           carvedilol 3.125 mg tablet  Commonly known as: Coreg           cetirizine 10 mg tablet  Commonly known as: ZyrTEC           cholecalciferol 10 mcg (400 units) tablet  Commonly known as: Vitamin D-3           clindamycin 1 % lotion  Commonly known as: Cleocin T           clopidogrel 75 mg tablet  Commonly known as: Plavix           clotrimazole-betamethasone cream  Commonly known as: Lotrisone      Apply 1 Application topically 2 times a day. Apply to toes on feet.       fluocinonide 0.05 % ointment  Commonly known as: Lidex           FLUoxetine 40 mg capsule  Commonly known as: PROzac           metoprolol tartrate 25 mg tablet  Commonly known as: Lopressor      TAKE 1/2 TWICE DAILY       Mirena 20 mcg/24hr IUD  Generic drug: levonorgestrel           Mounjaro 5 mg/0.5 mL pen injector  Generic drug: tirzepatide      Inject 5 mg under the skin 1 (one) time per week. Compound with B12 0.4 mg/ml for nausea       NUTRITIONAL SUPPLEMENTS ORAL           ONE-A-DAY WOMENS FORMULA ORAL           Vumerity 231 mg capsule,delayed release(DR/EC)  Generic drug: diroximel fumarate                    Procedure  Splint     *This report was transcribed using voice recognition software.  Every effort was made to ensure accuracy; however, inadvertent computerized transcription errors may be present.*  Janell MARIE-CNP  08/02/25           [1]   Past Medical History:  Diagnosis Date    Diseases of the nervous system complicating pregnancy, unspecified trimester 02/05/2020    Disease of nervous system affecting pregnancy    Encounter for supervision of other normal pregnancy, second trimester 01/08/2020    Multigravida in second trimester    Encounter for supervision of other normal pregnancy, third trimester 01/31/2020    Multigravida in third  trimester    Maternal care for other known or suspected poor fetal growth, first trimester, fetus 1 12/20/2019    SGA (small for gestational age), fetal, affecting care of mother, antepartum, first trimester, fetus 1    Personal history of other complications of pregnancy, childbirth and the puerperium 03/09/2020    History of pregnancy induced hypertension    Personal history of other mental and behavioral disorders     History of depression    Supervision of elderly multigravida, unspecified trimester ([facility]) 01/29/2020    Elderly multigravida    Unspecified pre-eclampsia, third trimester ([facility]) 02/05/2020    Pre-eclampsia in third trimester   [2]   Past Surgical History:  Procedure Laterality Date    CARDIAC CATHETERIZATION      KNEE SURGERY Left     KNEE SURGERY Right     KNEE SURGERY Left     OTHER SURGICAL HISTORY  10/08/2019    Ankle surgery   [3]   Family History  Problem Relation Name Age of Onset    Arthritis Mother      Stroke Father      Epilepsy Father      Heart disease Father      Depression Other      Hypertension Other      Stroke Other          CVA    Rheum arthritis Other      Osteoarthritis Other      Seizures Other      Spina bifida Other      Bell's palsy Other      Diabetes type II Other      Breast cancer Other      Alzheimer's disease Other      Breast cancer Maternal Grandmother Tiara    [4]   Social History  Tobacco Use    Smoking status: Never    Smokeless tobacco: Never   Vaping Use    Vaping status: Never Used   Substance Use Topics    Alcohol use: Never    Drug use: Never        FRANK Nunes-PRANAV  08/02/25 2009

## 2025-08-03 NOTE — ED PROCEDURE NOTE
Procedure  Splint Application    Performed by: VIKTOR Nunes  Authorized by: VIKTOR Nunes    Consent:     Consent obtained:  Verbal    Consent given by:  Patient    Risks, benefits, and alternatives were discussed: yes      Risks discussed:  Discoloration, swelling, pain and numbness    Alternatives discussed:  No treatment  Universal protocol:     Procedure explained and questions answered to patient or proxy's satisfaction: yes      Imaging studies available: yes      Immediately prior to procedure a time out was called: yes      Patient identity confirmed:  Verbally with patient  Pre-procedure details:     Distal neurologic exam:  Normal    Distal perfusion: distal pulses strong and brisk capillary refill    Procedure details:     Location:  Ankle    Ankle location:  R ankle    Cast type:  Short leg    Splint type:  Ankle stirrup    Supplies:  Cotton padding, fiberglass and elastic bandage    Attestation: Splint applied and adjusted personally by me    Post-procedure details:     Distal neurologic exam:  Normal    Distal perfusion: distal pulses strong and brisk capillary refill      Procedure completion:  Tolerated well, no immediate complications    Post-procedure imaging: not applicable                 VIKTOR Nunes  08/02/25 2010

## 2025-09-19 ENCOUNTER — APPOINTMENT (OUTPATIENT)
Dept: RADIOLOGY | Facility: CLINIC | Age: 45
End: 2025-09-19
Payer: COMMERCIAL

## 2025-09-29 ENCOUNTER — APPOINTMENT (OUTPATIENT)
Dept: PRIMARY CARE | Facility: CLINIC | Age: 45
End: 2025-09-29
Payer: COMMERCIAL